# Patient Record
Sex: FEMALE | Race: WHITE | HISPANIC OR LATINO
[De-identification: names, ages, dates, MRNs, and addresses within clinical notes are randomized per-mention and may not be internally consistent; named-entity substitution may affect disease eponyms.]

---

## 2018-06-29 ENCOUNTER — RESULT REVIEW (OUTPATIENT)
Age: 58
End: 2018-06-29

## 2018-07-02 ENCOUNTER — APPOINTMENT (OUTPATIENT)
Dept: ORTHOPEDIC SURGERY | Facility: CLINIC | Age: 58
End: 2018-07-02
Payer: COMMERCIAL

## 2018-07-02 VITALS
BODY MASS INDEX: 41.03 KG/M2 | SYSTOLIC BLOOD PRESSURE: 90 MMHG | HEART RATE: 68 BPM | WEIGHT: 209 LBS | DIASTOLIC BLOOD PRESSURE: 57 MMHG | HEIGHT: 60 IN

## 2018-07-02 DIAGNOSIS — Z80.9 FAMILY HISTORY OF MALIGNANT NEOPLASM, UNSPECIFIED: ICD-10-CM

## 2018-07-02 DIAGNOSIS — Z78.9 OTHER SPECIFIED HEALTH STATUS: ICD-10-CM

## 2018-07-02 PROCEDURE — 73130 X-RAY EXAM OF HAND: CPT | Mod: 50

## 2018-07-02 PROCEDURE — 99204 OFFICE O/P NEW MOD 45 MIN: CPT

## 2018-07-12 ENCOUNTER — OUTPATIENT (OUTPATIENT)
Dept: OUTPATIENT SERVICES | Facility: HOSPITAL | Age: 58
LOS: 1 days | Discharge: HOME | End: 2018-07-12
Payer: MEDICAID

## 2018-07-12 ENCOUNTER — RESULT REVIEW (OUTPATIENT)
Age: 58
End: 2018-07-12

## 2018-07-12 VITALS
HEART RATE: 84 BPM | WEIGHT: 212.08 LBS | RESPIRATION RATE: 13 BRPM | SYSTOLIC BLOOD PRESSURE: 120 MMHG | HEIGHT: 59.5 IN | TEMPERATURE: 97 F | DIASTOLIC BLOOD PRESSURE: 86 MMHG

## 2018-07-12 DIAGNOSIS — Z98.890 OTHER SPECIFIED POSTPROCEDURAL STATES: Chronic | ICD-10-CM

## 2018-07-12 PROCEDURE — 88305 TISSUE EXAM BY PATHOLOGIST: CPT | Mod: 26

## 2018-07-12 RX ORDER — SODIUM CHLORIDE 9 MG/ML
1000 INJECTION INTRAMUSCULAR; INTRAVENOUS; SUBCUTANEOUS
Qty: 0 | Refills: 0 | Status: DISCONTINUED | OUTPATIENT
Start: 2018-07-12 | End: 2018-07-27

## 2018-07-13 LAB — SURGICAL PATHOLOGY FINAL REPORT - CH: SIGNIFICANT CHANGE UP

## 2018-07-17 DIAGNOSIS — Z12.11 ENCOUNTER FOR SCREENING FOR MALIGNANT NEOPLASM OF COLON: ICD-10-CM

## 2018-07-17 DIAGNOSIS — K63.5 POLYP OF COLON: ICD-10-CM

## 2018-07-17 DIAGNOSIS — D12.2 BENIGN NEOPLASM OF ASCENDING COLON: ICD-10-CM

## 2018-07-17 DIAGNOSIS — E66.01 MORBID (SEVERE) OBESITY DUE TO EXCESS CALORIES: ICD-10-CM

## 2018-07-17 DIAGNOSIS — K62.1 RECTAL POLYP: ICD-10-CM

## 2018-07-17 DIAGNOSIS — K64.8 OTHER HEMORRHOIDS: ICD-10-CM

## 2018-07-17 DIAGNOSIS — K57.30 DIVERTICULOSIS OF LARGE INTESTINE WITHOUT PERFORATION OR ABSCESS WITHOUT BLEEDING: ICD-10-CM

## 2018-07-17 DIAGNOSIS — E78.5 HYPERLIPIDEMIA, UNSPECIFIED: ICD-10-CM

## 2018-07-24 PROBLEM — Z80.9 FAMILY HISTORY OF MALIGNANT NEOPLASM: Status: ACTIVE | Noted: 2018-07-02

## 2018-07-24 PROBLEM — Z78.9 DOES NOT USE ILLICIT DRUGS: Status: ACTIVE | Noted: 2018-07-02

## 2018-07-26 ENCOUNTER — OUTPATIENT (OUTPATIENT)
Dept: OUTPATIENT SERVICES | Facility: HOSPITAL | Age: 58
LOS: 1 days | Discharge: HOME | End: 2018-07-26

## 2018-07-26 ENCOUNTER — APPOINTMENT (OUTPATIENT)
Dept: ORTHOPEDIC SURGERY | Facility: HOSPITAL | Age: 58
End: 2018-07-26
Payer: COMMERCIAL

## 2018-07-26 VITALS
SYSTOLIC BLOOD PRESSURE: 135 MMHG | HEART RATE: 76 BPM | HEIGHT: 60 IN | RESPIRATION RATE: 16 BRPM | WEIGHT: 190.04 LBS | TEMPERATURE: 98 F | OXYGEN SATURATION: 95 % | DIASTOLIC BLOOD PRESSURE: 78 MMHG

## 2018-07-26 VITALS
DIASTOLIC BLOOD PRESSURE: 77 MMHG | TEMPERATURE: 98 F | RESPIRATION RATE: 14 BRPM | HEART RATE: 60 BPM | SYSTOLIC BLOOD PRESSURE: 116 MMHG

## 2018-07-26 DIAGNOSIS — Z98.890 OTHER SPECIFIED POSTPROCEDURAL STATES: Chronic | ICD-10-CM

## 2018-07-26 PROCEDURE — 64721 CARPAL TUNNEL SURGERY: CPT | Mod: RT

## 2018-07-26 RX ORDER — OXYCODONE HYDROCHLORIDE 5 MG/1
1 TABLET ORAL
Qty: 16 | Refills: 0 | OUTPATIENT
Start: 2018-07-26 | End: 2018-07-29

## 2018-07-26 RX ORDER — FAMOTIDINE 10 MG/ML
20 INJECTION INTRAVENOUS ONCE
Qty: 0 | Refills: 0 | Status: COMPLETED | OUTPATIENT
Start: 2018-07-26 | End: 2018-07-26

## 2018-07-26 RX ORDER — SODIUM CHLORIDE 9 MG/ML
3 INJECTION INTRAMUSCULAR; INTRAVENOUS; SUBCUTANEOUS EVERY 8 HOURS
Qty: 0 | Refills: 0 | Status: DISCONTINUED | OUTPATIENT
Start: 2018-07-26 | End: 2018-07-26

## 2018-07-26 RX ORDER — ACETAMINOPHEN 500 MG
975 TABLET ORAL ONCE
Qty: 0 | Refills: 0 | Status: COMPLETED | OUTPATIENT
Start: 2018-07-26 | End: 2018-07-26

## 2018-07-26 RX ADMIN — Medication 975 MILLIGRAM(S): at 08:38

## 2018-07-26 RX ADMIN — FAMOTIDINE 20 MILLIGRAM(S): 10 INJECTION INTRAVENOUS at 08:38

## 2018-07-26 NOTE — ASU DISCHARGE PLAN (ADULT/PEDIATRIC). - SPECIAL INSTRUCTIONS
1. Rest/ice  2. Keep dressing clean/dry  3. Pain meds prn, Rx given  4. F/u with Dr Mojica in 7-10 days, call office for appt 1. Rest/ice  2. Keep dressing clean/dry, remove postop day 3  3. Pain meds prn, Rx given  4. F/u with Dr Mojica in 7-10 days, call office for appt

## 2018-07-26 NOTE — ASU DISCHARGE PLAN (ADULT/PEDIATRIC). - MEDICATION SUMMARY - MEDICATIONS TO TAKE
I will START or STAY ON the medications listed below when I get home from the hospital:    Percocet 5/325 oral tablet  -- 1 tab(s) by mouth every 6 hours, As Needed MDD:4  -- Caution federal law prohibits the transfer of this drug to any person other  than the person for whom it was prescribed.  May cause drowsiness.  Alcohol may intensify this effect.  Use care when operating dangerous machinery.  This prescription cannot be refilled.  This product contains acetaminophen.  Do not use  with any other product containing acetaminophen to prevent possible liver damage.  Using more of this medication than prescribed may cause serious breathing problems.    -- Indication: For prn pain

## 2018-07-26 NOTE — ASU DISCHARGE PLAN (ADULT/PEDIATRIC). - NOTIFY
Persistent Nausea and Vomiting/Bleeding that does not stop/Numbness, color, or temperature change to extremity/Fever greater than 101/Pain not relieved by Medications/Swelling that continues

## 2018-07-31 DIAGNOSIS — G56.03 CARPAL TUNNEL SYNDROME, BILATERAL UPPER LIMBS: ICD-10-CM

## 2018-07-31 DIAGNOSIS — E66.9 OBESITY, UNSPECIFIED: ICD-10-CM

## 2018-08-06 ENCOUNTER — APPOINTMENT (OUTPATIENT)
Dept: ORTHOPEDIC SURGERY | Facility: CLINIC | Age: 58
End: 2018-08-06
Payer: COMMERCIAL

## 2018-08-06 VITALS
HEART RATE: 65 BPM | DIASTOLIC BLOOD PRESSURE: 72 MMHG | WEIGHT: 209 LBS | SYSTOLIC BLOOD PRESSURE: 123 MMHG | BODY MASS INDEX: 41.03 KG/M2 | HEIGHT: 60 IN

## 2018-08-06 DIAGNOSIS — G56.03 CARPAL TUNNEL SYNDROM,BILATERAL UPPER LIMBS: ICD-10-CM

## 2018-08-06 PROCEDURE — 99024 POSTOP FOLLOW-UP VISIT: CPT

## 2019-01-16 PROBLEM — G56.00 CARPAL TUNNEL SYNDROME, UNSPECIFIED UPPER LIMB: Chronic | Status: ACTIVE | Noted: 2018-07-25

## 2019-01-18 ENCOUNTER — APPOINTMENT (OUTPATIENT)
Dept: ORTHOPEDIC SURGERY | Facility: CLINIC | Age: 59
End: 2019-01-18
Payer: COMMERCIAL

## 2019-01-18 PROCEDURE — 20550 NJX 1 TENDON SHEATH/LIGAMENT: CPT | Mod: RT

## 2019-01-18 PROCEDURE — 99214 OFFICE O/P EST MOD 30 MIN: CPT | Mod: 25

## 2019-01-22 NOTE — HISTORY OF PRESENT ILLNESS
[FreeTextEntry1] : the patient is a 58-year-old female who presents today for evaluation of a right trigger thumb.Of note she had carpal tunnel release over the summer and is doing well from her surgery. She has noticed painful triggering of the right thumb IP joint over the past several weeks. The pain is dull in nature located over the A1 pulley with radiation to the IP joint.

## 2019-01-22 NOTE — ASSESSMENT
[FreeTextEntry1] : the patient is a 58-year-old female with a right trigger thumb. She received a cortisone injection today which she tolerated well. She'll continue activity as tolerated and followup if this does not relieve her symptoms.

## 2019-01-22 NOTE — PHYSICAL EXAM
[Normal Finger/nose] : finger to nose coordination [Normal] : no peripheral adenopathy appreciated [de-identified] : There is tenderness localized the over the A1 pulley of the right thumb. There is a negative Finkelstein test and negative grind test bilaterally. There is no tenderness over the MP or IP joints and no evidence of instability. There is full range of motion of the MP or IP joint with active equally passive range of motion. No tenderness over the radial or ulnar sesamoids bilaterally. The FPL and extensor mechanism are intact with 5 over 5 strength bilaterally. [de-identified] : carpal tunnel incision well-healed [de-identified] : umbertor

## 2019-01-22 NOTE — PROCEDURE
[FreeTextEntry1] : My impression is that this patient has stenosing tenosynovitis of the finger, more commonly known as a trigger finger.  I recommended that the patient undergo a trigger finger injection. The risks, benefits, and alternatives were discussed with the patient in detail. This included (but was not limited to) pain, infection, subcutaneous atrophy, skin depigmentation, etc... They agreed to undergo the steroid injection. Under informed consent and sterile conditions, 1/2 cc of 1% plain lidocaine  and 1 cc of Dexamethasone 4mg was precisely injected into the patient's right thumb.   A sterile Band-Aid was placed.  It is my hope that this significantly alleviates the patient's symptoms.\par

## 2019-06-11 ENCOUNTER — APPOINTMENT (OUTPATIENT)
Dept: ORTHOPEDIC SURGERY | Facility: CLINIC | Age: 59
End: 2019-06-11
Payer: COMMERCIAL

## 2019-06-11 DIAGNOSIS — M65.311 TRIGGER THUMB, RIGHT THUMB: ICD-10-CM

## 2019-06-11 PROCEDURE — 99213 OFFICE O/P EST LOW 20 MIN: CPT | Mod: 25

## 2019-06-11 PROCEDURE — 20550 NJX 1 TENDON SHEATH/LIGAMENT: CPT | Mod: RT

## 2019-06-11 NOTE — HISTORY OF PRESENT ILLNESS
[FreeTextEntry1] : 1/18/19: The patient is a 58-year-old female who presents today for evaluation of a right trigger thumb.Of note she had carpal tunnel release over the summer and is doing well from her surgery. She has noticed painful triggering of the right thumb IP joint over the past several weeks. The pain is dull in nature located over the A1 pulley with radiation to the IP joint.\par \par 6/11/19: the patient returns today for followup of her right trigger thumb. She had good relief from the cortisone injection however the triggering has recurred. She is interested in second injection.

## 2019-06-11 NOTE — PHYSICAL EXAM
[Normal Finger/nose] : finger to nose coordination [Normal] : Oriented to person, place, and time, insight and judgement were intact and the affect was normal [de-identified] : There is tenderness localized the over the A1 pulley of the right thumb. There is a negative Finkelstein test and negative grind test bilaterally. There is no tenderness over the MP or IP joints and no evidence of instability. There is full range of motion of the MP or IP joint with active equally passive range of motion. No tenderness over the radial or ulnar sesamoids bilaterally. The FPL and extensor mechanism are intact with 5 over 5 strength bilaterally. [de-identified] : carpal tunnel incision well-healed [de-identified] : umbertor

## 2019-06-13 PROBLEM — M65.311 TRIGGER FINGER OF RIGHT THUMB: Status: ACTIVE | Noted: 2019-01-22

## 2020-03-12 ENCOUNTER — APPOINTMENT (OUTPATIENT)
Dept: OBGYN | Facility: CLINIC | Age: 60
End: 2020-03-12
Payer: COMMERCIAL

## 2020-03-12 VITALS
SYSTOLIC BLOOD PRESSURE: 120 MMHG | HEIGHT: 60 IN | WEIGHT: 217 LBS | BODY MASS INDEX: 42.6 KG/M2 | DIASTOLIC BLOOD PRESSURE: 80 MMHG | TEMPERATURE: 98.2 F

## 2020-03-12 DIAGNOSIS — R10.2 PELVIC AND PERINEAL PAIN: ICD-10-CM

## 2020-03-12 PROCEDURE — 99203 OFFICE O/P NEW LOW 30 MIN: CPT

## 2020-03-12 NOTE — PHYSICAL EXAM
[Awake] : awake [Alert] : alert [Acute Distress] : no acute distress [Soft] : soft [Tender] : non tender [Distended] : not distended [H/Smegaly] : no hepatosplenomegaly [Firm] : not firm [Rigid] : not rigid [Rebound] : no rebound [Guarding] : no guarding [Oriented x3] : oriented to person, place, and time [Depressed Mood] : not depressed [Flat Affect] : affect not flat [Normal] : uterus [Labia Majora] : labia major [Motion Tenderness] : there was no cervical motion tenderness [Tenderness] : nontender [Adnexa Tenderness] : were not tender

## 2020-03-13 ENCOUNTER — FORM ENCOUNTER (OUTPATIENT)
Age: 60
End: 2020-03-13

## 2020-03-14 ENCOUNTER — OUTPATIENT (OUTPATIENT)
Dept: OUTPATIENT SERVICES | Facility: HOSPITAL | Age: 60
LOS: 1 days | End: 2020-03-14
Payer: COMMERCIAL

## 2020-03-14 ENCOUNTER — APPOINTMENT (OUTPATIENT)
Dept: ULTRASOUND IMAGING | Facility: CLINIC | Age: 60
End: 2020-03-14
Payer: COMMERCIAL

## 2020-03-14 DIAGNOSIS — Z00.8 ENCOUNTER FOR OTHER GENERAL EXAMINATION: ICD-10-CM

## 2020-03-14 DIAGNOSIS — Z98.890 OTHER SPECIFIED POSTPROCEDURAL STATES: Chronic | ICD-10-CM

## 2020-03-14 PROCEDURE — 76830 TRANSVAGINAL US NON-OB: CPT | Mod: 26

## 2020-03-14 PROCEDURE — 76830 TRANSVAGINAL US NON-OB: CPT

## 2020-03-14 PROCEDURE — 76856 US EXAM PELVIC COMPLETE: CPT

## 2020-03-14 PROCEDURE — 76856 US EXAM PELVIC COMPLETE: CPT | Mod: 26

## 2020-06-09 ENCOUNTER — EMERGENCY (EMERGENCY)
Facility: HOSPITAL | Age: 60
LOS: 0 days | Discharge: HOME | End: 2020-06-09
Attending: EMERGENCY MEDICINE
Payer: COMMERCIAL

## 2020-06-09 VITALS
TEMPERATURE: 98 F | SYSTOLIC BLOOD PRESSURE: 131 MMHG | RESPIRATION RATE: 19 BRPM | OXYGEN SATURATION: 96 % | HEART RATE: 77 BPM | DIASTOLIC BLOOD PRESSURE: 80 MMHG

## 2020-06-09 VITALS — WEIGHT: 210.1 LBS

## 2020-06-09 DIAGNOSIS — R42 DIZZINESS AND GIDDINESS: ICD-10-CM

## 2020-06-09 DIAGNOSIS — H55.09 OTHER FORMS OF NYSTAGMUS: ICD-10-CM

## 2020-06-09 DIAGNOSIS — G56.00 CARPAL TUNNEL SYNDROME, UNSPECIFIED UPPER LIMB: ICD-10-CM

## 2020-06-09 DIAGNOSIS — Z98.890 OTHER SPECIFIED POSTPROCEDURAL STATES: Chronic | ICD-10-CM

## 2020-06-09 LAB
ALBUMIN SERPL ELPH-MCNC: 3.7 G/DL — SIGNIFICANT CHANGE UP (ref 3.3–5)
ALP SERPL-CCNC: 116 U/L — SIGNIFICANT CHANGE UP (ref 40–120)
ALT FLD-CCNC: 25 U/L — SIGNIFICANT CHANGE UP (ref 12–78)
ANION GAP SERPL CALC-SCNC: 3 MMOL/L — LOW (ref 5–17)
APPEARANCE UR: CLEAR — SIGNIFICANT CHANGE UP
AST SERPL-CCNC: 18 U/L — SIGNIFICANT CHANGE UP (ref 15–37)
BASOPHILS # BLD AUTO: 0.03 K/UL — SIGNIFICANT CHANGE UP (ref 0–0.2)
BASOPHILS NFR BLD AUTO: 0.2 % — SIGNIFICANT CHANGE UP (ref 0–2)
BILIRUB SERPL-MCNC: 0.4 MG/DL — SIGNIFICANT CHANGE UP (ref 0.2–1.2)
BILIRUB UR-MCNC: NEGATIVE — SIGNIFICANT CHANGE UP
BUN SERPL-MCNC: 20 MG/DL — SIGNIFICANT CHANGE UP (ref 7–23)
CALCIUM SERPL-MCNC: 9.5 MG/DL — SIGNIFICANT CHANGE UP (ref 8.5–10.1)
CHLORIDE SERPL-SCNC: 107 MMOL/L — SIGNIFICANT CHANGE UP (ref 96–108)
CO2 SERPL-SCNC: 29 MMOL/L — SIGNIFICANT CHANGE UP (ref 22–31)
COLOR SPEC: YELLOW — SIGNIFICANT CHANGE UP
CREAT SERPL-MCNC: 1.05 MG/DL — SIGNIFICANT CHANGE UP (ref 0.5–1.3)
DIFF PNL FLD: NEGATIVE — SIGNIFICANT CHANGE UP
EOSINOPHIL # BLD AUTO: 0.42 K/UL — SIGNIFICANT CHANGE UP (ref 0–0.5)
EOSINOPHIL NFR BLD AUTO: 2.8 % — SIGNIFICANT CHANGE UP (ref 0–6)
GLUCOSE SERPL-MCNC: 99 MG/DL — SIGNIFICANT CHANGE UP (ref 70–99)
GLUCOSE UR QL: NEGATIVE MG/DL — SIGNIFICANT CHANGE UP
HCT VFR BLD CALC: 43.2 % — SIGNIFICANT CHANGE UP (ref 34.5–45)
HGB BLD-MCNC: 13.8 G/DL — SIGNIFICANT CHANGE UP (ref 11.5–15.5)
IMM GRANULOCYTES NFR BLD AUTO: 0.3 % — SIGNIFICANT CHANGE UP (ref 0–1.5)
KETONES UR-MCNC: NEGATIVE — SIGNIFICANT CHANGE UP
LEUKOCYTE ESTERASE UR-ACNC: NEGATIVE — SIGNIFICANT CHANGE UP
LYMPHOCYTES # BLD AUTO: 22.7 % — SIGNIFICANT CHANGE UP (ref 13–44)
LYMPHOCYTES # BLD AUTO: 3.35 K/UL — HIGH (ref 1–3.3)
MAGNESIUM SERPL-MCNC: 2.1 MG/DL — SIGNIFICANT CHANGE UP (ref 1.6–2.6)
MCHC RBC-ENTMCNC: 29.8 PG — SIGNIFICANT CHANGE UP (ref 27–34)
MCHC RBC-ENTMCNC: 31.9 GM/DL — LOW (ref 32–36)
MCV RBC AUTO: 93.3 FL — SIGNIFICANT CHANGE UP (ref 80–100)
MONOCYTES # BLD AUTO: 1.04 K/UL — HIGH (ref 0–0.9)
MONOCYTES NFR BLD AUTO: 7.1 % — SIGNIFICANT CHANGE UP (ref 2–14)
NEUTROPHILS # BLD AUTO: 9.87 K/UL — HIGH (ref 1.8–7.4)
NEUTROPHILS NFR BLD AUTO: 66.9 % — SIGNIFICANT CHANGE UP (ref 43–77)
NITRITE UR-MCNC: NEGATIVE — SIGNIFICANT CHANGE UP
PH UR: 5 — SIGNIFICANT CHANGE UP (ref 5–8)
PHOSPHATE SERPL-MCNC: 3.6 MG/DL — SIGNIFICANT CHANGE UP (ref 2.5–4.5)
PLATELET # BLD AUTO: 287 K/UL — SIGNIFICANT CHANGE UP (ref 150–400)
POTASSIUM SERPL-MCNC: 4.1 MMOL/L — SIGNIFICANT CHANGE UP (ref 3.5–5.3)
POTASSIUM SERPL-SCNC: 4.1 MMOL/L — SIGNIFICANT CHANGE UP (ref 3.5–5.3)
PROT SERPL-MCNC: 7.6 GM/DL — SIGNIFICANT CHANGE UP (ref 6–8.3)
PROT UR-MCNC: NEGATIVE MG/DL — SIGNIFICANT CHANGE UP
RBC # BLD: 4.63 M/UL — SIGNIFICANT CHANGE UP (ref 3.8–5.2)
RBC # FLD: 12.8 % — SIGNIFICANT CHANGE UP (ref 10.3–14.5)
SODIUM SERPL-SCNC: 139 MMOL/L — SIGNIFICANT CHANGE UP (ref 135–145)
SP GR SPEC: 1.01 — SIGNIFICANT CHANGE UP (ref 1.01–1.02)
UROBILINOGEN FLD QL: NEGATIVE MG/DL — SIGNIFICANT CHANGE UP
WBC # BLD: 14.75 K/UL — HIGH (ref 3.8–10.5)
WBC # FLD AUTO: 14.75 K/UL — HIGH (ref 3.8–10.5)

## 2020-06-09 PROCEDURE — 83735 ASSAY OF MAGNESIUM: CPT

## 2020-06-09 PROCEDURE — 71046 X-RAY EXAM CHEST 2 VIEWS: CPT | Mod: 26

## 2020-06-09 PROCEDURE — 85025 COMPLETE CBC W/AUTO DIFF WBC: CPT

## 2020-06-09 PROCEDURE — 99284 EMERGENCY DEPT VISIT MOD MDM: CPT | Mod: 25

## 2020-06-09 PROCEDURE — 84100 ASSAY OF PHOSPHORUS: CPT

## 2020-06-09 PROCEDURE — 80053 COMPREHEN METABOLIC PANEL: CPT

## 2020-06-09 PROCEDURE — 93010 ELECTROCARDIOGRAM REPORT: CPT

## 2020-06-09 PROCEDURE — 70450 CT HEAD/BRAIN W/O DYE: CPT

## 2020-06-09 PROCEDURE — 36415 COLL VENOUS BLD VENIPUNCTURE: CPT

## 2020-06-09 PROCEDURE — 99284 EMERGENCY DEPT VISIT MOD MDM: CPT

## 2020-06-09 PROCEDURE — 70450 CT HEAD/BRAIN W/O DYE: CPT | Mod: 26

## 2020-06-09 PROCEDURE — 96360 HYDRATION IV INFUSION INIT: CPT

## 2020-06-09 PROCEDURE — 71046 X-RAY EXAM CHEST 2 VIEWS: CPT

## 2020-06-09 PROCEDURE — 87086 URINE CULTURE/COLONY COUNT: CPT

## 2020-06-09 PROCEDURE — 93005 ELECTROCARDIOGRAM TRACING: CPT

## 2020-06-09 PROCEDURE — 81003 URINALYSIS AUTO W/O SCOPE: CPT

## 2020-06-09 RX ORDER — MECLIZINE HCL 12.5 MG
25 TABLET ORAL ONCE
Refills: 0 | Status: COMPLETED | OUTPATIENT
Start: 2020-06-09 | End: 2020-06-09

## 2020-06-09 RX ORDER — SODIUM CHLORIDE 9 MG/ML
1000 INJECTION INTRAMUSCULAR; INTRAVENOUS; SUBCUTANEOUS ONCE
Refills: 0 | Status: COMPLETED | OUTPATIENT
Start: 2020-06-09 | End: 2020-06-09

## 2020-06-09 RX ORDER — MECLIZINE HCL 12.5 MG
1 TABLET ORAL
Qty: 15 | Refills: 0
Start: 2020-06-09 | End: 2020-06-13

## 2020-06-09 RX ADMIN — SODIUM CHLORIDE 2000 MILLILITER(S): 9 INJECTION INTRAMUSCULAR; INTRAVENOUS; SUBCUTANEOUS at 14:33

## 2020-06-09 RX ADMIN — SODIUM CHLORIDE 1000 MILLILITER(S): 9 INJECTION INTRAMUSCULAR; INTRAVENOUS; SUBCUTANEOUS at 15:35

## 2020-06-09 RX ADMIN — Medication 25 MILLIGRAM(S): at 14:38

## 2020-06-09 NOTE — ED STATDOCS - PROGRESS NOTE DETAILS
Scribe TG for ED attending, Dr. Ngo: Pt with clear nail polish on and when pulse ox was switched to other finger found to be 94 percent Re-eval, pt reports dizziness as feeling like she sees foggy after removing glasses.  Neg spinning or motion sensation.  Neg chest pain, SOB.  Neg unsteady gait, nausea, vomiting, weakness to one side.  No loss of vision.  Neg HA, ear pains.  Head Ct without abnormalities.  Labs WNL aside from WBC elevated to 14.  Neg fevers, cough, vomiting, diarrhea.  Does not feel sick.  Given IVF in ED.  Repeat BP was 130/80.  Pulse ox 96%.  On exam, A&O x3.  CN intact.  EOMs intact with minimal horizontal nystagmus that fatigues.  Moving all ext.  Strength 5/5 in ext.  Neg Lanre.  Neg finger to nose.  Steady gait.  Visual acquity 20/30 bilat without glasses with Snellen.  Will dc home.  F.U with Dr. Velazquez.  Can continue Meclizine until re-eval.  Janina Oden PA-C

## 2020-06-09 NOTE — ED STATDOCS - PATIENT PORTAL LINK FT
You can access the FollowMyHealth Patient Portal offered by [facility] by registering at the following website: [email or web address]. By joining [employer]’s FollowMyHealth portal, you will also be able to view your health information using other applications (apps) compatible with our system.

## 2020-06-09 NOTE — ED STATDOCS - OBJECTIVE STATEMENT
58 y/o F with PMHx of Carpal tunnel syndrome, high cholestenol presenting to the ED c/o dizziness x2 days. Patient states that she has been feeling like she has no energy, dizziness does not feel like room spinning. No exacerbating or alleviating factors. Notes that she has mild HA associated with dizziness. Denies CP, SOB, recent travel, sick contacts, N/V/D, COVID exposure, fever, cough or chills. Non smoker, no EtOH use, no illegal drug use. PCD: Dr Ruelas

## 2020-06-09 NOTE — ED STATDOCS - NSFOLLOWUPINSTRUCTIONS_ED_ALL_ED_FT
Dizziness  Dizziness is a common problem. It makes you feel unsteady or light-headed. You may feel like you are about to pass out (faint). Dizziness can lead to getting hurt if you stumble or fall. Dizziness can be caused by many things, including:  Medicines.Not having enough water in your body (dehydration).Illness.Follow these instructions at home:  Eating and drinking        Drink enough fluid to keep your pee (urine) clear or pale yellow. This helps to keep you from getting dehydrated. Try to drink more clear fluids, such as water.Do not drink alcohol.Limit how much caffeine you drink or eat, if your doctor tells you to do that.Limit how much salt (sodium) you drink or eat, if your doctor tells you to do that.Activity        Avoid making quick movements.  When you stand up from sitting in a chair, steady yourself until you feel okay.In the morning, first sit up on the side of the bed. When you feel okay, stand slowly while you hold onto something. Do this until you know that your balance is fine.If you need to  one place for a long time, move your legs often. Tighten and relax the muscles in your legs while you are standing.Do not drive or use heavy machinery if you feel dizzy.Avoid bending down if you feel dizzy. Place items in your home so you can reach them easily without leaning over.Lifestyle     Do not use any products that contain nicotine or tobacco, such as cigarettes and e-cigarettes. If you need help quitting, ask your doctor.Try to lower your stress level. You can do this by using methods such as yoga or meditation. Talk with your doctor if you need help.General instructions     Watch your dizziness for any changes.Take over-the-counter and prescription medicines only as told by your doctor. Talk with your doctor if you think that you are dizzy because of a medicine that you are taking.Tell a friend or a family member that you are feeling dizzy. If he or she notices any changes in your behavior, have this person call your doctor.Keep all follow-up visits as told by your doctor. This is important.Contact a doctor if:  Your dizziness does not go away.Your dizziness or light-headedness gets worse.You feel sick to your stomach (nauseous).You have trouble hearing.You have new symptoms.You are unsteady on your feet.You feel like the room is spinning.Get help right away if:  You throw up (vomit) or have watery poop (diarrhea), and you cannot eat or drink anything.You have trouble:  Talking.Walking.Swallowing.Using your arms, hands, or legs.You feel generally weak.You are not thinking clearly, or you have trouble forming sentences. A friend or family member may notice this.You have:  Chest pain.Pain in your belly (abdomen).Shortness of breath.Sweating.Your vision changes.You are bleeding.You have a very bad headache.You have neck pain or a stiff neck.You have a fever.These symptoms may be an emergency. Do not wait to see if the symptoms will go away. Get medical help right away. Call your local emergency services (911 in the [place] Do not drive yourself to the hospital.   Summary  Dizziness makes you feel unsteady or light-headed. You may feel like you are about to pass out (faint).Drink enough fluid to keep your pee (urine) clear or pale yellow. Do not drink alcohol.Avoid making quick movements if you feel dizzy.Watch your dizziness for any changes.This information is not intended to replace advice given to you by your health care provider. Make sure you discuss any questions you have with your health care provider.    Document Released: 12/06/2012 Document Revised: 12/21/2018 Document Reviewed: 01/04/2018  [employer] Patient Education © 2020 [employer]

## 2020-06-09 NOTE — ED STATDOCS - ATTENDING CONTRIBUTION TO CARE
I, William Ngo MD,  performed the initial face to face bedside interview with this patient regarding history of present illness, review of symptoms and relevant past medical, social and family history.  I completed an independent physical examination.  I was the initial provider who evaluated this patient. I have signed out the follow up of any pending tests (i.e. labs, radiological studies) to the ACP.  I have communicated the patient’s plan of care and disposition with the ACP.  The history, relevant review of systems, past medical and surgical history, medical decision making, and physical examination was documented by the scribe in my presence and I attest to the accuracy of the documentation.

## 2020-06-10 LAB
CULTURE RESULTS: SIGNIFICANT CHANGE UP
SPECIMEN SOURCE: SIGNIFICANT CHANGE UP

## 2021-01-11 NOTE — ED ADULT TRIAGE NOTE - NS ED NURSE BANDS TYPE
Name band;
I have personally performed a face to face diagnostic evaluation on this patient. I have reviewed the ACP note and agree with the history, exam and plan of care, except as noted.

## 2021-11-09 ENCOUNTER — APPOINTMENT (OUTPATIENT)
Dept: DERMATOLOGY | Facility: CLINIC | Age: 61
End: 2021-11-09
Payer: COMMERCIAL

## 2021-11-09 DIAGNOSIS — L91.8 OTHER HYPERTROPHIC DISORDERS OF THE SKIN: ICD-10-CM

## 2021-11-09 DIAGNOSIS — L81.4 OTHER MELANIN HYPERPIGMENTATION: ICD-10-CM

## 2021-11-09 DIAGNOSIS — L82.1 OTHER SEBORRHEIC KERATOSIS: ICD-10-CM

## 2021-11-09 PROCEDURE — 99202 OFFICE O/P NEW SF 15 MIN: CPT

## 2021-11-09 RX ORDER — UBIDECARENONE/VIT E ACET 100MG-5
CAPSULE ORAL
Refills: 0 | Status: ACTIVE | COMMUNITY

## 2021-11-09 RX ORDER — MULTIVITAMIN
TABLET ORAL
Refills: 0 | Status: ACTIVE | COMMUNITY

## 2021-11-09 RX ORDER — ATORVASTATIN CALCIUM 80 MG/1
TABLET, FILM COATED ORAL
Refills: 0 | Status: ACTIVE | COMMUNITY

## 2021-11-09 RX ORDER — PSYLLIUM HUSK 0.4 G
CAPSULE ORAL
Refills: 0 | Status: ACTIVE | COMMUNITY

## 2021-11-09 RX ORDER — VITAMIN B COMPLEX
CAPSULE ORAL
Refills: 0 | Status: ACTIVE | COMMUNITY

## 2021-11-09 RX ORDER — ERGOCALCIFEROL 1.25 MG/1
1.25 MG CAPSULE, LIQUID FILLED ORAL
Refills: 0 | Status: ACTIVE | COMMUNITY

## 2021-11-09 NOTE — ASSESSMENT
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

## 2021-11-09 NOTE — HISTORY OF PRESENT ILLNESS
[FreeTextEntry1] : Evaluation of growths [de-identified] : First visit for 61-year-old female presenting for evaluation of growths. Particularly concerned about a lesion on the right cheek. No history of skin cancer

## 2021-11-09 NOTE — PHYSICAL EXAM
[Alert] : alert [Oriented x 3] : ~L oriented x 3 [Well Nourished] : well nourished [FreeTextEntry3] : The following areas were examined and no significant abnormalities were seen except as noted below:\par \par Type II skin\par \par scalp, face, eyelids, nose, lips, ears, neck, chest, abdomen, back, buttocks, right arm, left arm, right hand, left hand,\par right  leg, left leg, right foot, left foot\par Breast and groin exams offered and declined by patient.\par \par Right preauricular area: 15 x 8 mm brown verrucous plaque\par Neck: Multiple brown excrescences\par Multiple tan macules present on chest and arms\par Left mid back: Single skin colored polyp\par \par No suspicious lesions seen

## 2021-11-15 ENCOUNTER — RESULT REVIEW (OUTPATIENT)
Age: 61
End: 2021-11-15

## 2022-01-26 NOTE — ED STATDOCS - NS ED ATTENDING STATEMENT MOD
You are scheduled for an EGD on ___Monday, March 7, 2022______________________________    You should eat light meals the day before the procedure and nothing to eat or drink after midnight the night before your procedure.    You will need to be at the 1st floor admission desk at the hospital on ________________________    
I have personally performed a face to face diagnostic evaluation on this patient. I have reviewed the ACP note and agree with the history, exam and plan of care, except as noted.
